# Patient Record
Sex: MALE | Race: WHITE | NOT HISPANIC OR LATINO | Employment: FULL TIME | URBAN - METROPOLITAN AREA
[De-identification: names, ages, dates, MRNs, and addresses within clinical notes are randomized per-mention and may not be internally consistent; named-entity substitution may affect disease eponyms.]

---

## 2021-06-01 ENCOUNTER — TRANSCRIBE ORDERS (OUTPATIENT)
Dept: ADMINISTRATIVE | Facility: HOSPITAL | Age: 66
End: 2021-06-01

## 2021-06-01 DIAGNOSIS — G45.9 TIA (TRANSIENT ISCHEMIC ATTACK): Primary | ICD-10-CM

## 2021-06-01 DIAGNOSIS — G45.9 TRANSIENT ISCHEMIC ATTACK (TIA): Primary | ICD-10-CM

## 2021-06-01 DIAGNOSIS — G45.9 BRAIN TIA: Primary | ICD-10-CM

## 2021-06-07 ENCOUNTER — HOSPITAL ENCOUNTER (OUTPATIENT)
Dept: CT IMAGING | Facility: HOSPITAL | Age: 66
Discharge: HOME | End: 2021-06-07
Admitting: FAMILY MEDICINE

## 2021-06-07 DIAGNOSIS — G45.9 BRAIN TIA: ICD-10-CM

## 2021-06-07 PROCEDURE — 70450 CT HEAD/BRAIN W/O DYE: CPT

## 2021-06-14 ENCOUNTER — HOSPITAL ENCOUNTER (OUTPATIENT)
Dept: CARDIOLOGY | Facility: HOSPITAL | Age: 66
Discharge: HOME | End: 2021-06-14
Admitting: FAMILY MEDICINE

## 2021-06-14 DIAGNOSIS — G45.9 TRANSIENT ISCHEMIC ATTACK (TIA): ICD-10-CM

## 2021-06-14 LAB
BH CV XLRA MEAS LEFT CAROTID BULB EDV: 10.6 CM/SEC
BH CV XLRA MEAS LEFT CAROTID BULB PSV: 64 CM/SEC
BH CV XLRA MEAS LEFT DIST CCA EDV: 24.5 CM/SEC
BH CV XLRA MEAS LEFT DIST CCA PSV: 106 CM/SEC
BH CV XLRA MEAS LEFT DIST ICA EDV: 18.9 CM/SEC
BH CV XLRA MEAS LEFT DIST ICA PSV: 58.8 CM/SEC
BH CV XLRA MEAS LEFT MID ICA EDV: 26.6 CM/SEC
BH CV XLRA MEAS LEFT MID ICA PSV: 79.9 CM/SEC
BH CV XLRA MEAS LEFT PROX CCA EDV: 23.1 CM/SEC
BH CV XLRA MEAS LEFT PROX CCA PSV: 116 CM/SEC
BH CV XLRA MEAS LEFT PROX ECA EDV: 19.9 CM/SEC
BH CV XLRA MEAS LEFT PROX ECA PSV: 141 CM/SEC
BH CV XLRA MEAS LEFT PROX ICA EDV: 14 CM/SEC
BH CV XLRA MEAS LEFT PROX ICA PSV: 89 CM/SEC
BH CV XLRA MEAS LEFT VERTEBRAL A EDV: 14.9 CM/SEC
BH CV XLRA MEAS LEFT VERTEBRAL A PSV: 47.8 CM/SEC
BH CV XLRA MEAS RIGHT CAROTID BULB EDV: 11.8 CM/SEC
BH CV XLRA MEAS RIGHT CAROTID BULB PSV: 67.1 CM/SEC
BH CV XLRA MEAS RIGHT DIST CCA EDV: 22.4 CM/SEC
BH CV XLRA MEAS RIGHT DIST CCA PSV: 98.8 CM/SEC
BH CV XLRA MEAS RIGHT DIST ICA EDV: 24.9 CM/SEC
BH CV XLRA MEAS RIGHT DIST ICA PSV: 72.7 CM/SEC
BH CV XLRA MEAS RIGHT MID ICA EDV: 24.2 CM/SEC
BH CV XLRA MEAS RIGHT MID ICA PSV: 79.5 CM/SEC
BH CV XLRA MEAS RIGHT PROX CCA EDV: 18 CM/SEC
BH CV XLRA MEAS RIGHT PROX CCA PSV: 102 CM/SEC
BH CV XLRA MEAS RIGHT PROX ECA EDV: 14.1 CM/SEC
BH CV XLRA MEAS RIGHT PROX ECA PSV: 112 CM/SEC
BH CV XLRA MEAS RIGHT PROX ICA EDV: 23.6 CM/SEC
BH CV XLRA MEAS RIGHT PROX ICA PSV: 73.3 CM/SEC
BH CV XLRA MEAS RIGHT VERTEBRAL A EDV: 14.3 CM/SEC
BH CV XLRA MEAS RIGHT VERTEBRAL A PSV: 44.7 CM/SEC
LEFT ARM BP: NORMAL MMHG
MAXIMAL PREDICTED HEART RATE: 156 BPM
RIGHT ARM BP: NORMAL MMHG
STRESS TARGET HR: 133 BPM

## 2021-06-14 PROCEDURE — 93880 EXTRACRANIAL BILAT STUDY: CPT

## 2021-06-14 PROCEDURE — 93880 EXTRACRANIAL BILAT STUDY: CPT | Performed by: SURGERY

## 2021-06-26 ENCOUNTER — TRANSCRIBE ORDERS (OUTPATIENT)
Dept: ADMINISTRATIVE | Facility: HOSPITAL | Age: 66
End: 2021-06-26

## 2021-06-26 DIAGNOSIS — R41.3 AMNESIA: Primary | ICD-10-CM

## 2022-07-19 ENCOUNTER — TRANSCRIBE ORDERS (OUTPATIENT)
Dept: ADMINISTRATIVE | Facility: HOSPITAL | Age: 67
End: 2022-07-19

## 2022-07-19 DIAGNOSIS — M43.16 SPONDYLOLISTHESIS OF LUMBAR REGION: Primary | ICD-10-CM

## 2022-07-20 ENCOUNTER — HOSPITAL ENCOUNTER (OUTPATIENT)
Dept: MRI IMAGING | Facility: HOSPITAL | Age: 67
Discharge: HOME | End: 2022-07-20
Admitting: NURSE PRACTITIONER

## 2022-07-20 DIAGNOSIS — M43.16 SPONDYLOLISTHESIS OF LUMBAR REGION: ICD-10-CM

## 2022-07-20 PROCEDURE — 72148 MRI LUMBAR SPINE W/O DYE: CPT

## 2022-08-03 ENCOUNTER — OFFICE VISIT (OUTPATIENT)
Dept: NEUROSURGERY | Facility: CLINIC | Age: 67
End: 2022-08-03

## 2022-08-03 ENCOUNTER — HOSPITAL ENCOUNTER (OUTPATIENT)
Dept: GENERAL RADIOLOGY | Facility: HOSPITAL | Age: 67
Discharge: HOME | End: 2022-08-03
Admitting: NURSE PRACTITIONER

## 2022-08-03 VITALS — WEIGHT: 230 LBS | HEIGHT: 70 IN | BODY MASS INDEX: 32.93 KG/M2

## 2022-08-03 DIAGNOSIS — M43.00 PARS DEFECT WITH SPONDYLOLISTHESIS: Primary | ICD-10-CM

## 2022-08-03 DIAGNOSIS — M43.10 PARS DEFECT WITH SPONDYLOLISTHESIS: Primary | ICD-10-CM

## 2022-08-03 DIAGNOSIS — M43.10 PARS DEFECT WITH SPONDYLOLISTHESIS: ICD-10-CM

## 2022-08-03 DIAGNOSIS — M47.27 OSTEOARTHRITIS OF SPINE WITH RADICULOPATHY, LUMBOSACRAL REGION: ICD-10-CM

## 2022-08-03 DIAGNOSIS — M43.00 PARS DEFECT WITH SPONDYLOLISTHESIS: ICD-10-CM

## 2022-08-03 PROCEDURE — 99204 OFFICE O/P NEW MOD 45 MIN: CPT | Performed by: NURSE PRACTITIONER

## 2022-08-03 PROCEDURE — 72114 X-RAY EXAM L-S SPINE BENDING: CPT

## 2022-08-03 RX ORDER — DICLOFENAC SODIUM 75 MG/1
75 TABLET, DELAYED RELEASE ORAL 2 TIMES DAILY
COMMUNITY
End: 2022-09-28 | Stop reason: SDUPTHER

## 2022-08-03 NOTE — PROGRESS NOTES
Chief Complaint  Back Pain, Numbness, and Extremity Weakness    Subjective          Mario Alberto Gardner who is a 67 y.o. year old male who presents to [facility]UR[facility]ERY for evaluation of lumbar spine. This is a Workers Comp Case.      The patient complains of pain located in the lumbar spine.  Patients states the pain has been present for one month.  The pain came on acutely following an injury at work. 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 He immediately felt increased pain in his low back which has progressed since that time. He has pain and stiffness since that time. The pain scale level is 2/10 at rest, increases to 10 with certain activities.  The pain does radiate. Dermatomes are located bilaterally Lumbar at: L3 and L4.. This is worse on the left.  He has constant numbness and tingling in the thighs. The pain is waxing/waning and described as sharp and stabbing, with electric shocks in the legs.  The pain is worse at no particular time of day. Patient states prolonged standing, prolonged walking, bending, twisting and rising from a seated position makes the pain worse.  Patient states bending at the waist makes the pain better.    Associated Symptoms Include: Numbness, Tingling and Weakness  Conservative Interventions Include: Physical Therapy that was not very effective. Physical therapy made his pain worse. He is taking Diclofenac which provides little relief.    Was this the result of an injury or accident?: Yes, Work Injury July 11, 2022    History of Previous Spinal Surgery?: No    Nicotine use: smoker  (0.5-1 ppd) He had been working on quitting, though recently increased use due to his pain.    BMI: Body mass index is 33 kg/m².      Review of Systems   Musculoskeletal: Positive for arthralgias, back pain and gait problem.   Neurological: Positive for weakness and numbness.   All other systems reviewed and are negative.       Objective  "  Vital Signs:   Ht 177.8 cm (70\")   Wt 104 kg (230 lb)   BMI 33.00 kg/m²       Physical Exam  Vitals reviewed.   Constitutional:       Appearance: Normal appearance.   Musculoskeletal:      Lumbar back: Tenderness present. Negative right straight leg raise test and negative left straight leg raise test.      Right hip: No tenderness. Normal range of motion.      Left hip: No tenderness. Normal range of motion.   Neurological:      Mental Status: He is alert and oriented to person, place, and time.      Deep Tendon Reflexes:      Reflex Scores:       Patellar reflexes are 0 on the right side and 0 on the left side.       Achilles reflexes are 0 on the right side and 0 on the left side.       Neurologic Exam     Mental Status   Oriented to person, place, and time.   Level of consciousness: alert    Motor Exam   Muscle bulk: normal  Overall muscle tone: normal    Strength   Strength 5/5 except as noted. Weakness in the hip flexors on the left.     Sensory Exam   Light touch normal.   Sensory deficit distribution on left: L3    Gait, Coordination, and Reflexes     Gait  Gait: wide-based    Reflexes   Right patellar: 0  Left patellar: 0  Right achilles: 0  Left achilles: 0  Right ankle clonus: absent  Left ankle clonus: absentAntalgic gait with forward flexion at the waist.        Result Review :       Data reviewed: Radiologic studies MRI Lumbar Spine on 7/20/22 at [facility] personally reviewed. Multilevel spondylosis. There is grade 1 anterolisthesis of L3 on L4 measuring 1 cm secondary to chronic pars defects. Significant loss of disc height. Severe neural foraminal narrowing with probable impingement, though no canal stenosis. No high grade central canal stenosis throughout the lumbar spine.          Assessment and Plan    Diagnoses and all orders for this visit:    1. Pars defect with spondylolisthesis (Primary)  -     XR Spine Lumbar Complete With Flex & Ext; Future  -     Ambulatory Referral to Pain Management    2. " Osteoarthritis of spine with radiculopathy, lumbosacral region  -     Ambulatory Referral to Pain Management    Pt presenting for evaluation of acute low back and bilateral leg pain following an injury at work. We reviewed his MRI Lumbar Spine, demonstrating multilevel spondylosis most significant at L3/4 where these is grade 1 anterolisthesis secondary to chronic pars defects and significant loss of disc height. There is no spinal canal stenosis, though severe bilateral foraminal narrowing. We discussed that these are changes are chronic, though may be have been exacerbated by his injury. Will proceed with XR Lumbar Flexion/Extension views to evaluate for instability secondary to the injury. There would be consideration for posterior lumbar fusion at L3/4 if instability is present.     Pt will need to quit smoking. We discussed the importance of smoking/nicotine cessation. Smoking/nicotine use has multiple health risks. In particular related to the spine, nicotine increases the incidence of lower back pain, speeds up the progression of degenerative disc disease and dramatically reduces healing after spine surgery (particularly a fusion operation).     In regards to his pain, we will proceed with referral to pain management for OSMAR.    He will remain off work until his next follow up. Follow up in 8 weeks.    I spent 45 minutes caring for Mario Alberto on this date of service. This time includes time spent by me in the following activities:preparing for the visit, reviewing tests, obtaining and/or reviewing a separately obtained history, performing a medically appropriate examination and/or evaluation , counseling and educating the patient/family/caregiver, ordering medications, tests, or procedures, referring and communicating with other health care professionals , documenting information in the medical record, independently interpreting results and communicating that information with the patient/family/caregiver and care  coordination.    Follow Up   Return in about 8 weeks (around 9/28/2022).  Patient was given instructions and counseling regarding his condition or for health maintenance advice.     -XR Bending views  -Pain management referral for epidural injections  -Smoking cessation  -Off work until next follow up  -Follow up in 8 weeks on Tuesday or Thursday

## 2022-08-03 NOTE — PATIENT INSTRUCTIONS
-XR Bending views  -Pain management referral for epidural injections  -Smoking cessation  -Off work until next follow up  -Follow up in 8 weeks on Tuesday or Thursday

## 2022-08-08 ENCOUNTER — TELEPHONE (OUTPATIENT)
Dept: NEUROSURGERY | Facility: CLINIC | Age: 67
End: 2022-08-08

## 2022-08-08 NOTE — TELEPHONE ENCOUNTER
Caller: SAVANAH    Relationship: [employer]      Best call back number: [phone]    What form or medical record are you requesting: OV NOTE 8/3 AND WORK STATUS     Who is requesting this form or medical record from you: WORK COMP    How would you like to receive the form or medical records (pick-up, mail, fax):FAX  If fax, what is the fax number: [phone]  ATTENTION TO SAVANAH     Timeframe paperwork needed: ASAP    Additional notes: SAVANAH CALLED TO GET THE OV NOTE FROM 08/0/2022 AND THE PT WORK STATUS     PLEASE FAX TO HER     THANK YOU

## 2022-08-25 ENCOUNTER — TELEPHONE (OUTPATIENT)
Dept: NEUROSURGERY | Facility: CLINIC | Age: 67
End: 2022-08-25

## 2022-08-25 NOTE — TELEPHONE ENCOUNTER
Caller: ALVIN    Relationship: Payer    Best call back number: [phone]    What form or medical record are you requesting: LETTER FOR MEDICAL CAUSATION REGARDING REFERRAL TO PAIN MANAGEMENT & POSSIBLE EDU    Who is requesting this form or medical record from you: [employer]    How would you like to receive the form or medical records (pick-up, mail, fax): FAX  If fax, what is the fax number: [phone]    Timeframe paperwork needed:     Additional notes: DX TESTING SHOWED SOME DEGENERATIVE CHANGES.  PATIENT'S CLAIM HANDLER REQUESTING CLARIFICATION ON WHAT MAY BE PREEXISTING VS ACUTE, AND IF WORK INJURY IS RELATED TO TREATMENT REQUEST. ALVIN IS RE-FAXING THIS LETTER TO THE PRACTICE, AS IT WAS ORIGINALLY FAXED 8/12/22. PLEASE CONTACT ALVIN TO ADVISE ONCE THIS HAS BEEN COMPLETED.

## 2022-09-28 ENCOUNTER — OFFICE VISIT (OUTPATIENT)
Dept: NEUROSURGERY | Facility: CLINIC | Age: 67
End: 2022-09-28

## 2022-09-28 VITALS
HEIGHT: 70 IN | SYSTOLIC BLOOD PRESSURE: 162 MMHG | BODY MASS INDEX: 33.09 KG/M2 | WEIGHT: 231.1 LBS | DIASTOLIC BLOOD PRESSURE: 88 MMHG

## 2022-09-28 DIAGNOSIS — M47.27 OSTEOARTHRITIS OF SPINE WITH RADICULOPATHY, LUMBOSACRAL REGION: Primary | ICD-10-CM

## 2022-09-28 DIAGNOSIS — M43.00 PARS DEFECT WITH SPONDYLOLISTHESIS: ICD-10-CM

## 2022-09-28 DIAGNOSIS — M43.10 PARS DEFECT WITH SPONDYLOLISTHESIS: ICD-10-CM

## 2022-09-28 DIAGNOSIS — M62.838 MUSCLE SPASM: ICD-10-CM

## 2022-09-28 PROCEDURE — 99214 OFFICE O/P EST MOD 30 MIN: CPT | Performed by: NURSE PRACTITIONER

## 2022-09-28 RX ORDER — CYCLOBENZAPRINE HCL 10 MG
10 TABLET ORAL 2 TIMES DAILY PRN
Qty: 60 TABLET | Refills: 1 | Status: SHIPPED | OUTPATIENT
Start: 2022-09-28 | End: 2022-12-13

## 2022-09-28 RX ORDER — DICLOFENAC SODIUM 75 MG/1
75 TABLET, DELAYED RELEASE ORAL 2 TIMES DAILY
Qty: 60 TABLET | Refills: 1 | Status: SHIPPED | OUTPATIENT
Start: 2022-09-28 | End: 2022-12-13

## 2022-09-28 NOTE — PROGRESS NOTES
Chief Complaint  Back Pain    Subjective          Mario Alberto Gardner who is a 67 y.o. year old male who presents to [facility] for follow up of low back pain following work injury. OSMAR.     History of Present Illness  XR Lumbar Spine on 8/3/22 demonstrates no evidence of instability on flexion and extension. Old pars defects L3 and L5.     Pt is scheduled for his lumbar epidural injection on October 12th. He rates his back pain 7/10 today. Driving makes the pain worse. Pain radiates into the posterior thigh stopping at the knee. Will have occasional shooting pains into the left leg. These are not as often.     He is out of Diclofenac, which was providing modest benefit. He has muscle stiffness and spasms.           Interval History 8/3/22    Mario Alberto Gardner who is a 67 y.o. year old male who presents to [facility] NE[facility]UR[facility]ERY for evaluation of lumbar spine. This is a Workers Comp Case.        The patient complains of pain located in the lumbar spine.  Patients states the pain has been present for one month.  The pain came on acutely following an injury at work. 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 He immediately felt increased pain in his low back which has progressed since that time. He has pain and stiffness since that time. The pain scale level is 2/10 at rest, increases to 10 with certain activities.  The pain does radiate. Dermatomes are located bilaterally Lumbar at: L3 and L4.. This is worse on the left.  He has constant numbness and tingling in the thighs. The pain is waxing/waning and described as sharp and stabbing, with electric shocks in the legs.  The pain is worse at no particular time of day. Patient states prolonged standing, prolonged walking, bending, twisting and rising from a seated position makes the pain worse.  Patient states bending at the waist makes the pain better.     Associated Symptoms  "Include: Numbness, Tingling and Weakness  Conservative Interventions Include: Physical Therapy that was not very effective. Physical therapy made his pain worse. He is taking Diclofenac which provides little relief.     Was this the result of an injury or accident?: Yes, Work Injury July 11, 2022     History of Previous Spinal Surgery?: No     Nicotine use: smoker  (0.5-1 ppd) He had been working on quitting, though recently increased use due to his pain.     BMI: Body mass index is 33 kg/m².    Recent Interventions: Diclofenac. Scheduled for LESI.      Review of Systems   Musculoskeletal: Positive for arthralgias, back pain, gait problem and myalgias.   Neurological: Positive for weakness and numbness.   All other systems reviewed and are negative.       Objective   Vital Signs:   /88 (BP Location: Left arm, Patient Position: Sitting)   Ht 177.8 cm (70\")   Wt 105 kg (231 lb 1.6 oz)   BMI 33.16 kg/m²       Physical Exam  Vitals reviewed.   Constitutional:       Appearance: Normal appearance.   Musculoskeletal:      Lumbar back: Tenderness present. Negative right straight leg raise test and negative left straight leg raise test.      Right hip: No tenderness. Normal range of motion.      Left hip: No tenderness. Normal range of motion.   Neurological:      Mental Status: He is alert and oriented to person, place, and time.      Gait: Gait is intact.      Deep Tendon Reflexes: Strength normal.      Reflex Scores:       Patellar reflexes are 0 on the right side and 0 on the left side.       Achilles reflexes are 0 on the right side and 0 on the left side.       Neurologic Exam     Mental Status   Oriented to person, place, and time.     Motor Exam   Muscle bulk: normal  Overall muscle tone: normal    Strength   Strength 5/5 throughout.     Gait, Coordination, and Reflexes     Gait  Gait: normal    Reflexes   Right patellar: 0  Left patellar: 0  Right achilles: 0  Left achilles: 0       Result Review : "       Data reviewed: Radiologic studies MRI Lumbar Spine on 7/20/22 at [facility] personally reviewed. Multilevel spondylosis. There is grade 1 anterolisthesis of L3 on L4 measuring 1 cm secondary to chronic pars defects. Significant loss of disc height. Severe neural foraminal narrowing with probable impingement, though no canal stenosis. No high grade central canal stenosis throughout the lumbar spine.        XR Lumbar Spine on 8/3/22 demonstrates no evidence of instability on flexion and extension. Old pars defects L3 and L5.     Assessment and Plan    Diagnoses and all orders for this visit:    1. Osteoarthritis of spine with radiculopathy, lumbosacral region (Primary)  -     diclofenac (VOLTAREN) 75 MG EC tablet; Take 1 tablet by mouth 2 (Two) Times a Day.  Dispense: 60 tablet; Refill: 1    2. Pars defect with spondylolisthesis    3. Muscle spasm  -     cyclobenzaprine (FLEXERIL) 10 MG tablet; Take 1 tablet by mouth 2 (Two) Times a Day As Needed for Muscle Spasms.  Dispense: 60 tablet; Refill: 1    XR Lumbar Spine demonstrates no instability on flexion/extension. He is scheduled for lumbar epidural injection L3/4 on October 12th. He will follow up in 4 weeks to evaluate response to injection. Start Flexeril as needed for muscle spasms and refill Diclofenac for his pain.         Follow Up   Return in about 4 weeks (around 10/26/2022).  Patient was given instructions and counseling regarding his condition or for health maintenance advice.

## 2022-09-28 NOTE — PATIENT INSTRUCTIONS
-Diclofenac and Flexeril as needed  -Scheduled for LESI on 10/14  -Off work until next follow up  -Follow up in 4 weeks

## 2022-10-14 ENCOUNTER — TELEPHONE (OUTPATIENT)
Dept: NEUROSURGERY | Facility: CLINIC | Age: 67
End: 2022-10-14

## 2022-10-14 NOTE — TELEPHONE ENCOUNTER
Cedrick with [employer] states he has spoken with patient's employer, and they can accommodate restrictions. Would patient be able to return to any type of restricted work? Can fax letter back to [phone] or call Cedrick back at [phone].

## 2022-10-26 ENCOUNTER — TELEPHONE (OUTPATIENT)
Dept: NEUROSURGERY | Facility: CLINIC | Age: 67
End: 2022-10-26

## 2022-11-01 ENCOUNTER — OFFICE VISIT (OUTPATIENT)
Dept: NEUROSURGERY | Facility: CLINIC | Age: 67
End: 2022-11-01

## 2022-11-01 VITALS
SYSTOLIC BLOOD PRESSURE: 136 MMHG | DIASTOLIC BLOOD PRESSURE: 72 MMHG | BODY MASS INDEX: 32.74 KG/M2 | HEART RATE: 90 BPM | WEIGHT: 228.7 LBS | HEIGHT: 70 IN

## 2022-11-01 DIAGNOSIS — M54.41 ACUTE MIDLINE LOW BACK PAIN WITH BILATERAL SCIATICA: Primary | ICD-10-CM

## 2022-11-01 DIAGNOSIS — M62.838 MUSCLE SPASM: ICD-10-CM

## 2022-11-01 DIAGNOSIS — M43.10 PARS DEFECT WITH SPONDYLOLISTHESIS: ICD-10-CM

## 2022-11-01 DIAGNOSIS — M47.27 OSTEOARTHRITIS OF SPINE WITH RADICULOPATHY, LUMBOSACRAL REGION: ICD-10-CM

## 2022-11-01 DIAGNOSIS — M54.42 ACUTE MIDLINE LOW BACK PAIN WITH BILATERAL SCIATICA: Primary | ICD-10-CM

## 2022-11-01 DIAGNOSIS — M43.00 PARS DEFECT WITH SPONDYLOLISTHESIS: ICD-10-CM

## 2022-11-01 PROCEDURE — 99213 OFFICE O/P EST LOW 20 MIN: CPT | Performed by: NURSE PRACTITIONER

## 2022-11-01 NOTE — PATIENT INSTRUCTIONS
-Pain management for lumbar epidural #2  -Physical therapy x6 weeks  -Off work until next follow up  -Follow up in 6 weeks

## 2022-11-01 NOTE — PROGRESS NOTES
Chief Complaint  Follow-up (Pt had appointment with Pain Management today. The plan is for another injection)    Subjective          Mario Alberto Gardner who is a 67 y.o. year old male who presents to [facility]URGERY for follow up of low back pain following work injury. LESI on 10/12/22.    History of Present Illness  Pt received his first LESI with pain management on 10/12/22. Appreciated good benefit from the injection. Pain has been primarily in the back now. He has had around 50% improvement in his pain ongoing since the injection. He saw pain management today for follow up. They are recommending another LESI L3/4. Rates his pain 4/10 today. He reports that he did some lifting at home over the weekend which did increase his pain quite a bit. He had concerns of muscle spasms in the back. Flexeril provided relief for his back pain. He continues Diclofenac as needed.     He has remained off work. Has concerns of deconditioning and stiffness in the back since being off. He was previously not able to tolerate physical therapy due to his pain. He is interested in resuming PT for strengthening with goal to return to work.       Interval History   Mario Alberto Gardner who is a 67 y.o. year old male who presents to [facility] NEUR[facility]URGERY for follow up of low back pain following work injury. LESI.      History of Present Illness  XR Lumbar Spine on 8/3/22 demonstrates no evidence of instability on flexion and extension. Old pars defects L3 and L5.     Pt is scheduled for his lumbar epidural injection on October 12th. He rates his back pain 7/10 today. Driving makes the pain worse. Pain radiates into the posterior thigh stopping at the knee. Will have occasional shooting pains into the left leg. These are not as often.      He is out of Diclofenac, which was providing modest benefit. He has muscle stiffness and spasms.               Interval History 8/3/22     Mario Alberto  Jj who is a 67 y.o. year old male who presents to [facility]ERY for evaluation of lumbar spine. This is a Workers Comp Case.        The patient complains of pain located in the lumbar spine.  Patients states the pain has been present for one month.  The pain came on acutely following an injury at work. 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 He immediately felt increased pain in his low back which has progressed since that time. He has pain and stiffness since that time. The pain scale level is 2/10 at rest, increases to 10 with certain activities.  The pain does radiate. Dermatomes are located bilaterally Lumbar at: L3 and L4.. This is worse on the left.  He has constant numbness and tingling in the thighs. The pain is waxing/waning and described as sharp and stabbing, with electric shocks in the legs.  The pain is worse at no particular time of day. Patient states prolonged standing, prolonged walking, bending, twisting and rising from a seated position makes the pain worse.  Patient states bending at the waist makes the pain better.     Associated Symptoms Include: Numbness, Tingling and Weakness  Conservative Interventions Include: Physical Therapy that was not very effective. Physical therapy made his pain worse. He is taking Diclofenac which provides little relief.     Was this the result of an injury or accident?: Yes, Work Injury July 11, 2022     History of Previous Spinal Surgery?: No     Nicotine use: smoker  (0.5-1 ppd) He had been working on quitting, though recently increased use due to his pain.     BMI: Body mass index is 33 kg/m².     Recent Interventions: Diclofenac. Scheduled for LESI.        Review of Systems   Musculoskeletal: Positive for arthralgias, back pain and myalgias (muscle spasms).   All other systems reviewed and are negative.       Objective   Vital Signs:   /72 (BP Location: Left arm, Patient Position:  "Sitting, Cuff Size: Large Adult)   Pulse 90   Ht 177.8 cm (70\")   Wt 104 kg (228 lb 11.2 oz)   BMI 32.82 kg/m²       Physical Exam  Vitals reviewed.   Constitutional:       Appearance: Normal appearance.   Musculoskeletal:      Lumbar back: Tenderness present. Negative right straight leg raise test and negative left straight leg raise test.      Right hip: No tenderness. Normal range of motion.      Left hip: No tenderness. Normal range of motion.   Neurological:      Mental Status: He is alert and oriented to person, place, and time.      Motor: Motor strength is normal.      Gait: Gait is intact.      Deep Tendon Reflexes:      Reflex Scores:       Patellar reflexes are 0 on the right side and 0 on the left side.       Achilles reflexes are 0 on the right side and 0 on the left side.       Neurologic Exam     Mental Status   Oriented to person, place, and time.     Motor Exam   Muscle bulk: normal  Overall muscle tone: normal    Strength   Strength 5/5 throughout.     Gait, Coordination, and Reflexes     Gait  Gait: normal    Reflexes   Right patellar: 0  Left patellar: 0  Right achilles: 0  Left achilles: 0       Result Review :                 Assessment and Plan    Diagnoses and all orders for this visit:    1. Acute midline low back pain with bilateral sciatica (Primary)  -     Ambulatory Referral to Physical Therapy Evaluate and treat; Heat, Electrotherapy; Moist heat; Tens (Home); Cross Fiber; Stretching (Traction), ROM, Strengthening  -     Ambulatory Referral to Pain Management    2. Muscle spasm  -     Ambulatory Referral to Physical Therapy Evaluate and treat; Heat, Electrotherapy; Moist heat; Tens (Home); Cross Fiber; Stretching (Traction), ROM, Strengthening    3. Osteoarthritis of spine with radiculopathy, lumbosacral region  -     Ambulatory Referral to Physical Therapy Evaluate and treat; Heat, Electrotherapy; Moist heat; Tens (Home); Cross Fiber; Stretching (Traction), ROM, Strengthening  -     " Ambulatory Referral to Pain Management    4. Pars defect with spondylolisthesis  -     Ambulatory Referral to Physical Therapy Evaluate and treat; Heat, Electrotherapy; Moist heat; Tens (Home); Cross Fiber; Stretching (Traction), ROM, Strengthening  -     Ambulatory Referral to Pain Management    Pt presenting with acute exacerbation of back and leg pain following a work injury. He is responding to LESI with pain management, who has recommended a second injection. Will place this referral for LESI L3/4. Now that his pain is better controlled, will refer to physical therapy for traction, E-stim, and strengthening with plans to return to work at his next follow up. He will remain off work until completion of physical therapy and LESI #2 Follow up for re-evaluation in 6 weeks.       Follow Up   Return in about 6 weeks (around 12/13/2022).  Patient was given instructions and counseling regarding his condition or for health maintenance advice.     -Pain management for lumbar epidural #2  -Physical therapy x6 weeks  -Off work until next follow up  -Follow up in 6 weeks

## 2022-11-03 ENCOUNTER — TELEPHONE (OUTPATIENT)
Dept: NEUROSURGERY | Facility: CLINIC | Age: 67
End: 2022-11-03

## 2022-11-09 ENCOUNTER — TELEPHONE (OUTPATIENT)
Dept: NEUROSURGERY | Facility: CLINIC | Age: 67
End: 2022-11-09

## 2022-11-14 ENCOUNTER — TELEPHONE (OUTPATIENT)
Dept: NEUROSURGERY | Facility: CLINIC | Age: 67
End: 2022-11-14

## 2022-12-13 ENCOUNTER — OFFICE VISIT (OUTPATIENT)
Dept: NEUROSURGERY | Facility: CLINIC | Age: 67
End: 2022-12-13

## 2022-12-13 VITALS
HEIGHT: 70 IN | DIASTOLIC BLOOD PRESSURE: 81 MMHG | SYSTOLIC BLOOD PRESSURE: 146 MMHG | HEART RATE: 89 BPM | WEIGHT: 228.1 LBS | BODY MASS INDEX: 32.65 KG/M2

## 2022-12-13 DIAGNOSIS — M43.10 PARS DEFECT WITH SPONDYLOLISTHESIS: ICD-10-CM

## 2022-12-13 DIAGNOSIS — M54.42 ACUTE MIDLINE LOW BACK PAIN WITH BILATERAL SCIATICA: Primary | ICD-10-CM

## 2022-12-13 DIAGNOSIS — M47.27 OSTEOARTHRITIS OF SPINE WITH RADICULOPATHY, LUMBOSACRAL REGION: ICD-10-CM

## 2022-12-13 DIAGNOSIS — M43.00 PARS DEFECT WITH SPONDYLOLISTHESIS: ICD-10-CM

## 2022-12-13 DIAGNOSIS — M54.41 ACUTE MIDLINE LOW BACK PAIN WITH BILATERAL SCIATICA: Primary | ICD-10-CM

## 2022-12-13 PROCEDURE — 99214 OFFICE O/P EST MOD 30 MIN: CPT | Performed by: NURSE PRACTITIONER

## 2022-12-13 NOTE — PATIENT INSTRUCTIONS
-Ok to return to work with the following restrictions: no lifting greater than 30 pounds, limit jarring type activities  -Follow up as needed

## 2022-12-13 NOTE — PROGRESS NOTES
Chief Complaint  Follow-up (PT and injections helping)    Subjective          Mario Alberto Gardner who is a 67 y.o. year old male who presents to [facility] for follow up of low back pain following work injury.    History of Present Illness  Pt has appreciated 75-80% improvement in his pain with second LESI on 12/8/22. He has been working with physical therapy.    His back pain has significantly improved. He rates his pain a 1/10 today. He denies any pain or weakness into the legs. He has increased activity at home. He is ready to return to work.     He is no longer taking Diclofenac as this caused GI disturbance. He is no longer having muscle spasms and has stopped Flexeril.       Interval History 11/1/22    Mario Alberto Gardner who is a 67 y.o. year old male who presents to [facility] for follow up of low back pain following work injury. LESI on 10/12/22.     History of Present Illness  Pt received his first LESI with pain management on 10/12/22. Appreciated good benefit from the injection. Pain has been primarily in the back now. He has had around 50% improvement in his pain ongoing since the injection. He saw pain management today for follow up. They are recommending another LESI L3/4. Rates his pain 4/10 today. He reports that he did some lifting at home over the weekend which did increase his pain quite a bit. He had concerns of muscle spasms in the back. Flexeril provided relief for his back pain. He continues Diclofenac as needed.      He has remained off work. Has concerns of deconditioning and stiffness in the back since being off. He was previously not able to tolerate physical therapy due to his pain. He is interested in resuming PT for strengthening with goal to return to work.     Recent Interventions: LESI, Physical therapy      Review of Systems   Musculoskeletal: Positive for arthralgias and back pain.   All other systems reviewed  "and are negative.       Objective   Vital Signs:   /81 (BP Location: Left arm, Patient Position: Sitting)   Pulse 89   Ht 177.8 cm (70\")   Wt 103 kg (228 lb 1.6 oz)   BMI 32.73 kg/m²       Physical Exam  Vitals reviewed.   Constitutional:       Appearance: Normal appearance.   Musculoskeletal:      Lumbar back: No tenderness. Negative right straight leg raise test and negative left straight leg raise test.      Right hip: No tenderness. Normal range of motion.      Left hip: No tenderness. Normal range of motion.   Neurological:      Mental Status: He is alert and oriented to person, place, and time.      Motor: Motor strength is normal.      Gait: Gait is intact.      Deep Tendon Reflexes:      Reflex Scores:       Patellar reflexes are 0 on the right side and 0 on the left side.       Achilles reflexes are 0 on the right side and 0 on the left side.       Neurologic Exam     Mental Status   Oriented to person, place, and time.     Motor Exam   Muscle bulk: normal  Overall muscle tone: normal    Strength   Strength 5/5 throughout.     Gait, Coordination, and Reflexes     Gait  Gait: normal    Reflexes   Right patellar: 0  Left patellar: 0  Right achilles: 0  Left achilles: 0       Result Review :                 Assessment and Plan    Diagnoses and all orders for this visit:    1. Acute midline low back pain with bilateral sciatica (Primary)    2. Osteoarthritis of spine with radiculopathy, lumbosacral region    3. Pars defect with spondylolisthesis    Pt's pain has improved with LESI. Strength and mobility has improved with physical therapy. We will release to work with the following permanent restrictions: no lifting greater than 30 pounds and limit jarring type activities. He drives a large truck, moving materials with a fork lift.     He will follow up in our office as needed.       Follow Up   Return if symptoms worsen or fail to improve.  Patient was given instructions and counseling regarding his " condition or for health maintenance advice.

## 2022-12-15 ENCOUNTER — TELEPHONE (OUTPATIENT)
Dept: NEUROSURGERY | Facility: CLINIC | Age: 67
End: 2022-12-15

## 2022-12-27 ENCOUNTER — TELEPHONE (OUTPATIENT)
Dept: NEUROSURGERY | Facility: CLINIC | Age: 67
End: 2022-12-27

## 2022-12-27 NOTE — TELEPHONE ENCOUNTER
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  NEEDS CLARIFICATION IF RESTRICTIONS ARE DUE TO SPRAIN STRAIN/WORK INJURY OR THE NOT ALLOWED PRE-EXISTING CONDITIONS.  PLEASE CALL ALVIN WITH ANY QUESTIONS.

## 2022-12-28 NOTE — TELEPHONE ENCOUNTER
Spoke to Cedrick with [employer], he is looking to clarify that the permanent restrictions are based on only the lumbar strain from his workman's comp claim, and not the pre-existing conditions.

## 2023-01-05 ENCOUNTER — TELEPHONE (OUTPATIENT)
Dept: NEUROSURGERY | Facility: CLINIC | Age: 68
End: 2023-01-05
Payer: COMMERCIAL

## 2023-01-05 NOTE — TELEPHONE ENCOUNTER
Mario Alberto request a call back regarding a letter that was sent to [employer]. Mario Alberto states that he cannot return to work with restrictions.   Best call back number [phone]

## 2023-01-05 NOTE — TELEPHONE ENCOUNTER
Due to his restrictions being related to his acute and chronic issues, he may need to schedule an appointment through his primary insurance should he wish to return with recommended permanent restrictions.

## 2023-01-10 NOTE — TELEPHONE ENCOUNTER
Informed patient of Torie's message below. He wants to reach out to [employer] prior to making an appointment.    Due to his restrictions being related to his acute and chronic issues, he may need to schedule an appointment through his primary insurance should he wish to return with recommended permanent restrictions.

## 2023-08-23 ENCOUNTER — TRANSCRIBE ORDERS (OUTPATIENT)
Dept: ADMINISTRATIVE | Facility: HOSPITAL | Age: 68
End: 2023-08-23
Payer: COMMERCIAL

## 2023-08-23 DIAGNOSIS — M48.02 CERVICAL STENOSIS OF SPINAL CANAL: Primary | ICD-10-CM

## 2023-09-26 ENCOUNTER — HOSPITAL ENCOUNTER (OUTPATIENT)
Dept: MRI IMAGING | Facility: HOSPITAL | Age: 68
Discharge: HOME | End: 2023-09-26
Admitting: ORTHOPAEDIC SURGERY
Payer: MEDICARE

## 2023-09-26 DIAGNOSIS — M48.02 CERVICAL STENOSIS OF SPINAL CANAL: ICD-10-CM

## 2023-09-26 PROCEDURE — 72141 MRI NECK SPINE W/O DYE: CPT
